# Patient Record
Sex: FEMALE | Race: WHITE | NOT HISPANIC OR LATINO | Employment: FULL TIME | URBAN - METROPOLITAN AREA
[De-identification: names, ages, dates, MRNs, and addresses within clinical notes are randomized per-mention and may not be internally consistent; named-entity substitution may affect disease eponyms.]

---

## 2023-10-25 ENCOUNTER — OFFICE VISIT (OUTPATIENT)
Dept: PRIMARY CARE | Facility: CLINIC | Age: 63
End: 2023-10-25
Payer: COMMERCIAL

## 2023-10-25 VITALS
WEIGHT: 194.6 LBS | TEMPERATURE: 97.4 F | BODY MASS INDEX: 27.86 KG/M2 | SYSTOLIC BLOOD PRESSURE: 137 MMHG | HEIGHT: 70 IN | DIASTOLIC BLOOD PRESSURE: 86 MMHG | HEART RATE: 83 BPM | OXYGEN SATURATION: 98 %

## 2023-10-25 DIAGNOSIS — Z78.0 POSTMENOPAUSAL: ICD-10-CM

## 2023-10-25 DIAGNOSIS — Z13.820 SCREENING FOR OSTEOPOROSIS: ICD-10-CM

## 2023-10-25 DIAGNOSIS — I10 BENIGN ESSENTIAL HYPERTENSION: Primary | ICD-10-CM

## 2023-10-25 DIAGNOSIS — E28.39 OVARIAN FAILURE: ICD-10-CM

## 2023-10-25 DIAGNOSIS — E78.2 MIXED HYPERLIPIDEMIA: ICD-10-CM

## 2023-10-25 DIAGNOSIS — F41.9 ANXIETY: ICD-10-CM

## 2023-10-25 PROBLEM — E78.5 HYPERLIPEMIA: Status: ACTIVE | Noted: 2023-10-25

## 2023-10-25 PROBLEM — H90.0 CONDUCTIVE HEARING LOSS, BILATERAL: Status: ACTIVE | Noted: 2023-10-25

## 2023-10-25 PROCEDURE — 1036F TOBACCO NON-USER: CPT | Performed by: FAMILY MEDICINE

## 2023-10-25 PROCEDURE — 3079F DIAST BP 80-89 MM HG: CPT | Performed by: FAMILY MEDICINE

## 2023-10-25 PROCEDURE — 99214 OFFICE O/P EST MOD 30 MIN: CPT | Performed by: FAMILY MEDICINE

## 2023-10-25 PROCEDURE — 3075F SYST BP GE 130 - 139MM HG: CPT | Performed by: FAMILY MEDICINE

## 2023-10-25 RX ORDER — METOPROLOL SUCCINATE 25 MG/1
1 TABLET, EXTENDED RELEASE ORAL DAILY
COMMUNITY
Start: 2014-04-08 | End: 2024-03-11

## 2023-10-25 RX ORDER — ESCITALOPRAM OXALATE 10 MG/1
10 TABLET ORAL DAILY
COMMUNITY
Start: 2018-07-30 | End: 2023-12-16 | Stop reason: SDUPTHER

## 2023-10-25 RX ORDER — FLUTICASONE PROPIONATE 50 MCG
2 SPRAY, SUSPENSION (ML) NASAL
COMMUNITY
Start: 2019-11-20

## 2023-10-25 RX ORDER — LOSARTAN POTASSIUM AND HYDROCHLOROTHIAZIDE 25; 100 MG/1; MG/1
1 TABLET ORAL
COMMUNITY
Start: 2015-04-21 | End: 2024-03-11

## 2023-10-25 ASSESSMENT — PATIENT HEALTH QUESTIONNAIRE - PHQ9
1. LITTLE INTEREST OR PLEASURE IN DOING THINGS: NOT AT ALL
SUM OF ALL RESPONSES TO PHQ9 QUESTIONS 1 AND 2: 0
2. FEELING DOWN, DEPRESSED OR HOPELESS: NOT AT ALL

## 2023-10-25 NOTE — PROGRESS NOTES
Subjective   Patient ID: Daily Rojo is a 63 y.o. female who presents for New Patient Visit.    HPI     Patient states she is here to establish care.     No concerns at this time.    Pt declines flu shot     Patient has hypertension.  Patient does not monitor BP at home.    Denies CP, SOB, dizziness, and LE edema.    Patient is compliant with antihypertensive therapy and denies any noted side effects.    Patient has hyperlipidemia.  Patient tries to limit the amount of fatty foods and high cholesterol foods that are consumed.  Patient is compliant with current medication and denies any noted side effects.    She has known anxiety.  Symptoms have been stable with daily Lexapro.    Pt is postmenopausal.  Unsure of screening for osteoporosis.      Review of Systems  Constitutional: Patient denies any fever, chills, loss of appetite, or unexplained weight loss.  HEENT: Denies any headache, sore throat, eye pain, ear pain, decreased vision, or decreased hearing.  Patient also denies any rhinorrhea.  Cardiovascular: Patient denies any chest pain, shortness of breath with exertion, tachycardia, palpitations, orthopnea, or paroxysmal nocturnal dyspnea.  Respiratory: Patient denies any cough, shortness breath, or wheezing.  Gastrointestinal: Patient denies any nausea, vomiting, diarrhea, constipation, melena, hematochezia, or reflux symptoms.  Musculoskeletal: Patient denies any myalgia, arthralgia, joint swelling, or joint deformity   Skin: Denies any rashes or skin lesions.   Neurology: Patient denies any new motor or sensory losses.  Denies any numbness, tingling, weakness, and incoordination of the extremities.  Patient also denies any tremor, seizures, or gait instability.  Endocrinology: Denies any polyuria, polydipsia, polyphagia, or heat/cold intolerance.  Psychiatric: Denies any anxiety, depression, or suicidal/homicidal ideation.  Hematology: Patient denies any abnormal bruising or bleeding.      Objective   BP  "137/86   Pulse 83   Temp 36.3 °C (97.4 °F)   Ht 1.778 m (5' 10\")   Wt 88.3 kg (194 lb 9.6 oz)   SpO2 98%   BMI 27.92 kg/m²     Physical Exam  Gen. appearance: Alert and cooperative, no acute distress, well-developed/well-nourished.  Head: Normocephalic and atraumatic.  EENT: Pupils are equal round reactive to light, extraocular muscles are intact, mucous membranes are moist, external auditory canals and tympanic membranes are within normal limits bilaterally, pharynx is without erythema or exudate, there is no noted rhinorrhea.  Neck: Supple and without adenopathy, no JVD at 90° and no carotid bruits are noted.  There is no thyromegaly, thyroid tenderness, or palpable thyroid nodules.  Cardiovascular: Regular rate and rhythm without murmur or ectopy.  Respiratory: Lungs are clear to auscultation bilaterally with good air exchange.  Good respiratory effort and no accessory muscle use.  Abdomen: Soft, nontender/nondistended.  No masses, guarding, rebound, or rigidity.  No hepatosplenomegaly, abdominal bruits, or CVA tenderness.  Bowel sounds are normal.  There is no widening of the aortic pulsation.  Musculoskeletal: Patient has good range of motion of the shoulders, elbows, wrists, hips, knees.  There are no noted joint effusions or deformities.  Skin: Good turgor, moist, warm and without rashes or lesions.  Lymph nodes: No cervical, clavicular, or inguinal adenopathy.  Neurological exam: Alert and oriented ×3, no tremor, normal gait.  Psychiatric: Appropriate mood and affect, good insight and judgment, no delusions or thought disorders, no suicidal or homicidal ideation.  Extremities: No clubbing, cyanosis, or edema      Assessment/Plan     1. Benign essential hypertension  Blood pressure appears adequately controlled and we will continue with the current antihypertensive therapy.  - Lipid Panel; Future  - Basic Metabolic Panel; Future    2. Mixed hyperlipidemia  Patient will continue with the current " conservative treatment.  Dietary changes, exercise, and maintenance of healthy weight were discussed at length.  - Comprehensive Metabolic Panel; Future  - Lipid Panel; Future    3. Anxiety  Stable at this time.  Continue current medication without change.    4. Postmenopausal  We will order a screening for osteoporosis  - XR DEXA bone density; Future    5. Ovarian failure  We will order a screening for osteoporosis  - XR DEXA bone density; Future        MAMM DUE 4/8/2024  COLON DUE 3/2025    Orders Placed This Encounter   Procedures    XR DEXA bone density    Comprehensive Metabolic Panel    Lipid Panel    Basic Metabolic Panel     Requested Prescriptions      No prescriptions requested or ordered in this encounter

## 2023-10-25 NOTE — PATIENT INSTRUCTIONS
SCHEDULE YOUR BONE DENSITY.  HAVE LABS SOON AND AGAIN IN 6 MONTHS    IT WAS GREAT TO MEET YOU TODAY.  Follow up in 6 months with labs to be done PRIOR.    It was a pleasure to see you today. Thank you for choosing us for your health care needs.    If you have lab or other testing completed and have not been informed of results within one week, please call the office for your results.    If you haven't done so, consider signing up for [facility] [employer], the [facility] personal health record. Ask the staff how you can get started.

## 2023-11-06 ENCOUNTER — TELEPHONE (OUTPATIENT)
Dept: PRIMARY CARE | Facility: CLINIC | Age: 63
End: 2023-11-06
Payer: COMMERCIAL

## 2023-11-06 NOTE — TELEPHONE ENCOUNTER
Patient Stated she called for insurance for the dexa scan and they has told her that they'll approve preventive care not diagnosis. If is for diagnosis she would have to pay 300 out of pocket.

## 2023-12-16 DIAGNOSIS — F41.9 ANXIETY: Primary | ICD-10-CM

## 2023-12-17 RX ORDER — ESCITALOPRAM OXALATE 10 MG/1
10 TABLET ORAL DAILY
Qty: 90 TABLET | Refills: 0 | Status: SHIPPED | OUTPATIENT
Start: 2023-12-17 | End: 2024-03-18

## 2024-03-11 DIAGNOSIS — I10 BENIGN ESSENTIAL HYPERTENSION: Primary | ICD-10-CM

## 2024-03-11 RX ORDER — METOPROLOL SUCCINATE 25 MG/1
25 TABLET, EXTENDED RELEASE ORAL DAILY
Qty: 30 TABLET | Refills: 0 | Status: SHIPPED | OUTPATIENT
Start: 2024-03-11 | End: 2024-04-10 | Stop reason: SDUPTHER

## 2024-03-11 RX ORDER — LOSARTAN POTASSIUM AND HYDROCHLOROTHIAZIDE 25; 100 MG/1; MG/1
1 TABLET ORAL DAILY
Qty: 30 TABLET | Refills: 0 | Status: SHIPPED | OUTPATIENT
Start: 2024-03-11 | End: 2024-05-06 | Stop reason: SDUPTHER

## 2024-03-11 NOTE — TELEPHONE ENCOUNTER
Patient is requesting a short script of her blood pressure medications as she says she has run out at this time and she no longer has none left, please advise

## 2024-03-15 DIAGNOSIS — F41.9 ANXIETY: ICD-10-CM

## 2024-03-18 RX ORDER — ESCITALOPRAM OXALATE 10 MG/1
10 TABLET ORAL DAILY
Qty: 90 TABLET | Refills: 0 | Status: SHIPPED | OUTPATIENT
Start: 2024-03-18 | End: 2024-05-06 | Stop reason: SDUPTHER

## 2024-04-10 DIAGNOSIS — I10 BENIGN ESSENTIAL HYPERTENSION: ICD-10-CM

## 2024-04-10 RX ORDER — METOPROLOL SUCCINATE 25 MG/1
25 TABLET, EXTENDED RELEASE ORAL DAILY
Qty: 30 TABLET | Refills: 0 | Status: SHIPPED | OUTPATIENT
Start: 2024-04-10 | End: 2024-05-06 | Stop reason: SDUPTHER

## 2024-04-10 NOTE — TELEPHONE ENCOUNTER
Patient states she had gotten a call from [facility] and states the rx has to be resent in as there was an error within the system

## 2024-05-01 ENCOUNTER — LAB (OUTPATIENT)
Dept: LAB | Facility: LAB | Age: 64
End: 2024-05-01
Payer: COMMERCIAL

## 2024-05-01 DIAGNOSIS — E78.2 MIXED HYPERLIPIDEMIA: ICD-10-CM

## 2024-05-01 DIAGNOSIS — I10 BENIGN ESSENTIAL HYPERTENSION: ICD-10-CM

## 2024-05-01 LAB
ALBUMIN SERPL BCP-MCNC: 4.3 G/DL (ref 3.4–5)
ALP SERPL-CCNC: 77 U/L (ref 33–136)
ALT SERPL W P-5'-P-CCNC: 15 U/L (ref 7–45)
ANION GAP SERPL CALC-SCNC: 12 MMOL/L (ref 10–20)
AST SERPL W P-5'-P-CCNC: 17 U/L (ref 9–39)
BILIRUB SERPL-MCNC: 0.6 MG/DL (ref 0–1.2)
BUN SERPL-MCNC: 21 MG/DL (ref 6–23)
CALCIUM SERPL-MCNC: 9.4 MG/DL (ref 8.6–10.3)
CHLORIDE SERPL-SCNC: 100 MMOL/L (ref 98–107)
CHOLEST SERPL-MCNC: 180 MG/DL (ref 0–199)
CHOLESTEROL/HDL RATIO: 3.4
CO2 SERPL-SCNC: 30 MMOL/L (ref 21–32)
CREAT SERPL-MCNC: 0.69 MG/DL (ref 0.5–1.05)
EGFRCR SERPLBLD CKD-EPI 2021: >90 ML/MIN/1.73M*2
GLUCOSE SERPL-MCNC: 80 MG/DL (ref 74–99)
HDLC SERPL-MCNC: 53.3 MG/DL
LDLC SERPL CALC-MCNC: 108 MG/DL
NON HDL CHOLESTEROL: 127 MG/DL (ref 0–149)
POTASSIUM SERPL-SCNC: 3.6 MMOL/L (ref 3.5–5.3)
PROT SERPL-MCNC: 6.5 G/DL (ref 6.4–8.2)
SODIUM SERPL-SCNC: 138 MMOL/L (ref 136–145)
TRIGL SERPL-MCNC: 95 MG/DL (ref 0–149)
VLDL: 19 MG/DL (ref 0–40)

## 2024-05-01 PROCEDURE — 80053 COMPREHEN METABOLIC PANEL: CPT

## 2024-05-01 PROCEDURE — 80061 LIPID PANEL: CPT

## 2024-05-01 PROCEDURE — 36415 COLL VENOUS BLD VENIPUNCTURE: CPT

## 2024-05-06 ENCOUNTER — LAB (OUTPATIENT)
Dept: LAB | Facility: LAB | Age: 64
End: 2024-05-06
Payer: COMMERCIAL

## 2024-05-06 ENCOUNTER — OFFICE VISIT (OUTPATIENT)
Dept: PRIMARY CARE | Facility: CLINIC | Age: 64
End: 2024-05-06
Payer: COMMERCIAL

## 2024-05-06 VITALS
BODY MASS INDEX: 28.36 KG/M2 | TEMPERATURE: 97.2 F | OXYGEN SATURATION: 98 % | HEART RATE: 74 BPM | DIASTOLIC BLOOD PRESSURE: 82 MMHG | WEIGHT: 198.1 LBS | HEIGHT: 70 IN | SYSTOLIC BLOOD PRESSURE: 138 MMHG

## 2024-05-06 DIAGNOSIS — F41.9 ANXIETY: ICD-10-CM

## 2024-05-06 DIAGNOSIS — I10 BENIGN ESSENTIAL HYPERTENSION: Primary | ICD-10-CM

## 2024-05-06 DIAGNOSIS — R53.83 FATIGUE, UNSPECIFIED TYPE: ICD-10-CM

## 2024-05-06 DIAGNOSIS — Z12.31 ENCOUNTER FOR SCREENING MAMMOGRAM FOR MALIGNANT NEOPLASM OF BREAST: ICD-10-CM

## 2024-05-06 DIAGNOSIS — E78.2 MIXED HYPERLIPIDEMIA: ICD-10-CM

## 2024-05-06 LAB
BASOPHILS # BLD AUTO: 0.03 X10*3/UL (ref 0–0.1)
BASOPHILS NFR BLD AUTO: 0.6 %
EOSINOPHIL # BLD AUTO: 0.06 X10*3/UL (ref 0–0.7)
EOSINOPHIL NFR BLD AUTO: 1.3 %
ERYTHROCYTE [DISTWIDTH] IN BLOOD BY AUTOMATED COUNT: 12.6 % (ref 11.5–14.5)
HCT VFR BLD AUTO: 39 % (ref 36–46)
HGB BLD-MCNC: 13.1 G/DL (ref 12–16)
IMM GRANULOCYTES # BLD AUTO: 0.01 X10*3/UL (ref 0–0.7)
IMM GRANULOCYTES NFR BLD AUTO: 0.2 % (ref 0–0.9)
LYMPHOCYTES # BLD AUTO: 1.7 X10*3/UL (ref 1.2–4.8)
LYMPHOCYTES NFR BLD AUTO: 35.9 %
MCH RBC QN AUTO: 30.8 PG (ref 26–34)
MCHC RBC AUTO-ENTMCNC: 33.6 G/DL (ref 32–36)
MCV RBC AUTO: 92 FL (ref 80–100)
MONOCYTES # BLD AUTO: 0.3 X10*3/UL (ref 0.1–1)
MONOCYTES NFR BLD AUTO: 6.3 %
NEUTROPHILS # BLD AUTO: 2.63 X10*3/UL (ref 1.2–7.7)
NEUTROPHILS NFR BLD AUTO: 55.7 %
NRBC BLD-RTO: 0 /100 WBCS (ref 0–0)
PLATELET # BLD AUTO: 205 X10*3/UL (ref 150–450)
RBC # BLD AUTO: 4.26 X10*6/UL (ref 4–5.2)
TSH SERPL-ACNC: 2.27 MIU/L (ref 0.44–3.98)
WBC # BLD AUTO: 4.7 X10*3/UL (ref 4.4–11.3)

## 2024-05-06 PROCEDURE — 99214 OFFICE O/P EST MOD 30 MIN: CPT | Performed by: FAMILY MEDICINE

## 2024-05-06 PROCEDURE — 3075F SYST BP GE 130 - 139MM HG: CPT | Performed by: FAMILY MEDICINE

## 2024-05-06 PROCEDURE — 36415 COLL VENOUS BLD VENIPUNCTURE: CPT

## 2024-05-06 PROCEDURE — 84443 ASSAY THYROID STIM HORMONE: CPT

## 2024-05-06 PROCEDURE — 3079F DIAST BP 80-89 MM HG: CPT | Performed by: FAMILY MEDICINE

## 2024-05-06 PROCEDURE — 85025 COMPLETE CBC W/AUTO DIFF WBC: CPT

## 2024-05-06 RX ORDER — METOPROLOL SUCCINATE 25 MG/1
25 TABLET, EXTENDED RELEASE ORAL DAILY
Qty: 90 TABLET | Refills: 1 | Status: SHIPPED | OUTPATIENT
Start: 2024-05-06

## 2024-05-06 RX ORDER — ESCITALOPRAM OXALATE 10 MG/1
10 TABLET ORAL DAILY
Qty: 90 TABLET | Refills: 1 | Status: SHIPPED | OUTPATIENT
Start: 2024-05-06

## 2024-05-06 RX ORDER — LOSARTAN POTASSIUM AND HYDROCHLOROTHIAZIDE 25; 100 MG/1; MG/1
1 TABLET ORAL DAILY
Qty: 90 TABLET | Refills: 1 | Status: SHIPPED | OUTPATIENT
Start: 2024-05-06

## 2024-05-06 ASSESSMENT — PATIENT HEALTH QUESTIONNAIRE - PHQ9
1. LITTLE INTEREST OR PLEASURE IN DOING THINGS: NOT AT ALL
SUM OF ALL RESPONSES TO PHQ9 QUESTIONS 1 AND 2: 0
SUM OF ALL RESPONSES TO PHQ9 QUESTIONS 1 AND 2: 0
2. FEELING DOWN, DEPRESSED OR HOPELESS: NOT AT ALL
1. LITTLE INTEREST OR PLEASURE IN DOING THINGS: NOT AT ALL
2. FEELING DOWN, DEPRESSED OR HOPELESS: NOT AT ALL

## 2024-05-06 NOTE — PROGRESS NOTES
"Subjective   Patient ID: Daily Rojo is a 63 y.o. female who presents for Follow-up.    HPI     Patient states she has been fatigued lately. She says the fatigue has been going on for a few months now. She says this occurs mostly in the evening.     Patient is also concerned about her weight as she states she has been struggling to lose weight.      No other concerns at this time.     Patient has hypertension.  Patient does not monitor BP at home.    Denies CP, SOB, dizziness, and LE edema.    Patient is compliant with antihypertensive therapy and denies any noted side effects.    Patient has hyperlipidemia.  Patient tries to limit the amount of fatty foods and high cholesterol foods that are consumed.  Patient is currently treated conservatively with dietary changes and lifestyle modification.    She has anxiety.  Symptoms remain stable with the current dose of escitalopram.        Review of Systems  Constitutional: Patient denies any fever, chills, loss of appetite, or unexplained weight loss.  Cardiovascular: Patient denies any chest pain, shortness of breath with exertion, tachycardia, palpitations, orthopnea, or paroxysmal nocturnal dyspnea.  Respiratory: Patient denies any cough, shortness breath, or wheezing.  Skin: Denies any rashes or skin lesions.   Neurology: Patient denies any new motor or sensory losses.  Denies any numbness, tingling, weakness, and incoordination of the extremities.  Patient also denies any tremor, seizures, or gait instability.  Endocrinology: Denies any polyuria, polydipsia, polyphagia, or heat/cold intolerance.      Objective   /82   Pulse 74   Temp 36.2 °C (97.2 °F)   Ht 1.778 m (5' 10\")   Wt 89.9 kg (198 lb 1.6 oz)   SpO2 98%   BMI 28.42 kg/m²     Physical Exam  General Appearance: Alert and cooperative, in no acute distress, well-developed/well-nourished.  Neck: Supple and without adenopathy or rigidity.  There is no JVD at 90° and no carotid bruits are noted.  " There is no thyromegaly, thyroid tenderness, or palpable thyroid nodules.  Heart: Regular rate and rhythm without murmur or ectopy.  Respiratory: Lungs are clear to auscultation bilaterally with good air exchange.  Good respiratory effort and no accessory muscle use.  Skin: Good turgor, moist, warm and without rashes or lesions.  Neurological exam: Alert and oriented ×3, no tremor, normal gait.  Extremities: No clubbing, cyanosis, or edema      Assessment/Plan     Benign essential hypertension  Blood pressure appears adequately controlled and we will continue with the current antihypertensive therapy.     Mixed hyperlipidemia  Patient will continue with the current conservative treatment.  Dietary changes, exercise, and maintenance of healthy weight were discussed at length.    Anxiety  Stable at this time.  Continue current medication without change.     Screening for breast cancer:  Screening mammogram was ordered.    Fatigue: Labs were ordered for further evaluation.        MAMM DUE 4/8/2024  COLON DUE 3/2025        Orders Placed This Encounter   Procedures    BI mammo bilateral screening tomosynthesis    CBC and Auto Differential    TSH with reflex to Free T4 if abnormal     Requested Prescriptions     Signed Prescriptions Disp Refills    escitalopram (Lexapro) 10 mg tablet 90 tablet 1     Sig: Take 1 tablet (10 mg) by mouth once daily.    losartan-hydrochlorothiazide (Hyzaar) 100-25 mg tablet 90 tablet 1     Sig: Take 1 tablet by mouth once daily.    metoprolol succinate XL (Toprol-XL) 25 mg 24 hr tablet 90 tablet 1     Sig: Take 1 tablet (25 mg) by mouth once daily.

## 2024-05-06 NOTE — PATIENT INSTRUCTIONS
Labs today to evaluate the fatigue.    Follow up in 6 months.    It was a pleasure to see you today. Thank you for choosing us for your health care needs.    If you have lab or other testing completed and have not been informed of results within one week, please call the office for your results.    If you haven't done so, consider signing up for [facility], the [facility] personal health record. Ask the staff how you can get started.

## 2024-06-29 ENCOUNTER — APPOINTMENT (OUTPATIENT)
Dept: PRIMARY CARE | Facility: CLINIC | Age: 64
End: 2024-06-29
Payer: COMMERCIAL

## 2024-07-15 ENCOUNTER — APPOINTMENT (OUTPATIENT)
Dept: PRIMARY CARE | Facility: CLINIC | Age: 64
End: 2024-07-15
Payer: COMMERCIAL

## 2024-07-15 VITALS
TEMPERATURE: 97.2 F | HEART RATE: 90 BPM | DIASTOLIC BLOOD PRESSURE: 89 MMHG | BODY MASS INDEX: 28.42 KG/M2 | SYSTOLIC BLOOD PRESSURE: 137 MMHG | HEIGHT: 70 IN | OXYGEN SATURATION: 94 %

## 2024-07-15 DIAGNOSIS — T78.40XA ALLERGIC REACTION, INITIAL ENCOUNTER: Primary | ICD-10-CM

## 2024-07-15 PROCEDURE — 3079F DIAST BP 80-89 MM HG: CPT | Performed by: FAMILY MEDICINE

## 2024-07-15 PROCEDURE — 3075F SYST BP GE 130 - 139MM HG: CPT | Performed by: FAMILY MEDICINE

## 2024-07-15 PROCEDURE — 99212 OFFICE O/P EST SF 10 MIN: CPT | Performed by: FAMILY MEDICINE

## 2024-07-15 ASSESSMENT — PATIENT HEALTH QUESTIONNAIRE - PHQ9
2. FEELING DOWN, DEPRESSED OR HOPELESS: NOT AT ALL
SUM OF ALL RESPONSES TO PHQ9 QUESTIONS 1 AND 2: 0
1. LITTLE INTEREST OR PLEASURE IN DOING THINGS: NOT AT ALL

## 2024-07-15 NOTE — PROGRESS NOTES
"Subjective   Patient ID: Daily Rojo is a 64 y.o. female who presents for Eye Problem (Swollen Eyes).    HPI     Patient states she went to  a [facility] urgent care on 7/13/24 due to swollen bilateral eyes.   Pt was diagnosed with periorbital cellulitis.  She was given Augmentin.  She has also been taking benadryl twice a day. She says that her eyes have improved a lot since then, but says that she is still experiencing bilateral irritation, itching, redness, and a bit of swelling.     She states she tried some anti-aging lotion and thinks she had a  reaction that caused the swelling of her eyelids.        Review of Systems  Constitutional: Patient denies any fever, chills, loss of appetite, or unexplained weight loss.  Cardiovascular: Denies any chest pain, shortness of breath with exertion, tachycardia, palpitations.  Respiratory: Patient denies any cough, shortness of breath, or wheezing.    Eyes: Irritation, itching, redness, swelling bilaterally.     Objective   /89   Pulse 90   Temp 36.2 °C (97.2 °F)   Ht 1.778 m (5' 10\")   SpO2 94%   BMI 28.42 kg/m²     Physical Exam  General Appearance: Alert and cooperative, in no acute distress, well-developed/well-nourished.  Neck: Supple and without adenopathy or rigidity. There is no JVD at 90° and no carotid bruits are noted. There is no thyromegaly, thyroid tenderness, or palpable thyroid nodules.  Heart: Regular rate and rhythm without murmur or ectopy.  Lungs: Clear to auscultation bilaterally with good air exchange.  Skin: Good turgor, moist, warm and without rashes or lesions.  Neurological exam: Alert and oriented ×3, no tremor, normal gait.  Extremities: No clubbing, cyanosis, or edema    Eyes: Notable dry skin in the infraorbital region bilaterally. Conjunctiva and sclera appear normal.    Assessment/Plan        1. Allergic reaction, initial encounter  Recommended she  Cortisone-10 or Coratid to apply sparingly to the affected " infraorbital area.  She will finish the antibiotic prescribed by the Urgent Care.   Pt to follow up if not resolved over the next 5-7 days or ASAP if the symptoms worsen.      Scribe Attestation  By signing my name below, INinfa Scribe   attest that this documentation has been prepared under the direction and in the presence of Usman Hastings DO.

## 2024-07-15 NOTE — PATIENT INSTRUCTIONS
You can use the Cortaid as we discussed.    Finish the antibiotic given to you by the Urgent Care.    Follow up should symptoms worsen.    It was a pleasure to see you today. Thank you for choosing us for your health care needs.    If you have lab or other testing completed and have not been informed of results within one week, please call the office for your results.    If you haven't done so, consider signing up for [facility] [employer], the [facility] personal health record. Ask the staff how you can get started.

## 2024-08-22 ENCOUNTER — HOSPITAL ENCOUNTER (OUTPATIENT)
Dept: RADIOLOGY | Facility: EXTERNAL LOCATION | Age: 64
Discharge: HOME | End: 2024-08-22

## 2024-08-22 DIAGNOSIS — R52 PAIN: ICD-10-CM

## 2024-08-23 ENCOUNTER — OFFICE VISIT (OUTPATIENT)
Dept: ORTHOPEDIC SURGERY | Facility: CLINIC | Age: 64
End: 2024-08-23
Payer: COMMERCIAL

## 2024-08-23 DIAGNOSIS — S63.659A SPRAIN OF MCP JOINT OF HAND, INITIAL ENCOUNTER: ICD-10-CM

## 2024-08-23 DIAGNOSIS — S62.646A NONDISPLACED FRACTURE OF PROXIMAL PHALANX OF RIGHT LITTLE FINGER, INITIAL ENCOUNTER FOR CLOSED FRACTURE: Primary | ICD-10-CM

## 2024-08-23 PROCEDURE — 26720 TREAT FINGER FRACTURE EACH: CPT | Performed by: STUDENT IN AN ORGANIZED HEALTH CARE EDUCATION/TRAINING PROGRAM

## 2024-08-23 PROCEDURE — 99204 OFFICE O/P NEW MOD 45 MIN: CPT | Performed by: STUDENT IN AN ORGANIZED HEALTH CARE EDUCATION/TRAINING PROGRAM

## 2024-08-23 PROCEDURE — 1036F TOBACCO NON-USER: CPT | Performed by: STUDENT IN AN ORGANIZED HEALTH CARE EDUCATION/TRAINING PROGRAM

## 2024-08-23 NOTE — PROGRESS NOTES
History of Present Illness:  Presents for evaluation of right ring and small finger pain.  The patient sustained an acute injury and notes pain and swelling.    The pain is sharp in nature, severe, worse with movement and better with rest.    Sustained a fall on Wednesday.  Seen at [facility].    Review of Systems   GENERAL: Negative for malaise, significant weight loss, fever  MUSCULOSKELETAL: see HPI  NEURO:  Negative    The patient's past medical history, family history, social history, and review of systems were reviewed. History is otherwise negative except as stated in the HPI.    Physical Examination:  General: Alert and oriented to person, place, and time.  No acute distress and breathing comfortably: Pleasant and cooperative with examination.  HEENT: Head is normocephalic and atraumatic.  Neck: Supple, no visible swelling.  Cardiovascular: No palpable tachycardia  Lungs: No audible wheezing or labored breathing  Abdomen: Nondistended.  On musculoskeletal examination, the patient has full elbow range of motion. In regards to the hand, there is swelling with some deformity. There is ecchymosis of the hand, but the skin is intact. Range of motion and strength are limited secondary to pain. There is tenderness to palpation of the proximal phalanx of the ring and small finger along with pain over the ulnar MCPs of both. There is no obvious tenderness to palpation about the scaphoid or the SL interval, or distal radius. Sensation and motor function are intact in the radial, ulnar, and median nerve distribution. The patient has intact flexor and extensor function, but has difficulty making a full fist secondary to pain. The hand itself is warm and well perfused. The contralateral hand and wrist are normal to inspection, range of motion, stability, and strength.      Imaging:  AP, lateral, and oblique radiographs of the right hand demonstrate nondisplaced proximal phalanx base fracture of the ring  finger    Assessment:  Patient with a nondisplaced proximal phalanx base fracture of the ring finger.  Additionally presumed MCP sprain.    Plan:  I had long discussion with the patient regarding the fracture. I discussed the risks/benefits/expected outcomes of both non-operative and operative management. Based on the current alignment of the fracture, the patients medical history, and the patients preference, we have elected to proceed with a well-molded splint.  Okay to come out of 3 times a day and work on range of motion at home.  Needs to be worn full-time when outside the home.  The patient understands the risks associated with fracture care, which include but are not limited to, malunion, nonunion, loss of reduction or alignment, post traumatic arthrosis, avascular necrosis, chronic pain or stiffness, tendon rupture, compression neuropathy, and potential need for future surgery. In order to minimize the risk of fracture displacement, I will follow the fracture closely with repeat radiographs. I answered all of their questions.    Follow up: 4 weeks with x-ray right hand    Blanche Rodney MD

## 2024-09-17 NOTE — PROGRESS NOTES
History of Present Illness:  Patient returns today endorsing mild pain.  Denies any numbness or tingling. DOI 8/22/24    Review of Systems   GENERAL: Negative for malaise, significant weight loss, fever  MUSCULOSKELETAL: see HPI  NEURO:  Negative    Physical Examination:  Mild swelling, skin healthy and intact  Mild tenderness to palpation right ring finger proximal phalanx  + EPL, FPL, TOMAS  + SILT median, radial, ulnar nerve distribution   Good cap refill    Imaging:  Appropriate position and alignment    Assessment:   Patient with a healing fourth and fifth proximal phalanx base fracture    Plan:  Immobilization: cast/splint removed   Encouraged ROM of digits.  Discussed rehab goals and return to activity.  Follow-up: 1 month for motion check      Blanche Rod MD

## 2024-09-19 ENCOUNTER — APPOINTMENT (OUTPATIENT)
Dept: ORTHOPEDIC SURGERY | Facility: CLINIC | Age: 64
End: 2024-09-19
Payer: COMMERCIAL

## 2024-09-19 ENCOUNTER — HOSPITAL ENCOUNTER (OUTPATIENT)
Dept: RADIOLOGY | Facility: HOSPITAL | Age: 64
Discharge: HOME | End: 2024-09-19
Payer: COMMERCIAL

## 2024-09-19 DIAGNOSIS — M79.644 PAIN OF FINGER OF RIGHT HAND: ICD-10-CM

## 2024-09-19 DIAGNOSIS — S62.646A NONDISPLACED FRACTURE OF PROXIMAL PHALANX OF RIGHT LITTLE FINGER, INITIAL ENCOUNTER FOR CLOSED FRACTURE: ICD-10-CM

## 2024-09-19 DIAGNOSIS — S62.646A NONDISPLACED FRACTURE OF PROXIMAL PHALANX OF RIGHT LITTLE FINGER, INITIAL ENCOUNTER FOR CLOSED FRACTURE: Primary | ICD-10-CM

## 2024-09-19 PROCEDURE — 1036F TOBACCO NON-USER: CPT | Performed by: STUDENT IN AN ORGANIZED HEALTH CARE EDUCATION/TRAINING PROGRAM

## 2024-09-19 PROCEDURE — 73140 X-RAY EXAM OF FINGER(S): CPT | Mod: RT

## 2024-09-19 PROCEDURE — 99024 POSTOP FOLLOW-UP VISIT: CPT | Performed by: STUDENT IN AN ORGANIZED HEALTH CARE EDUCATION/TRAINING PROGRAM

## 2024-10-24 ENCOUNTER — APPOINTMENT (OUTPATIENT)
Dept: ORTHOPEDIC SURGERY | Facility: CLINIC | Age: 64
End: 2024-10-24
Payer: COMMERCIAL

## 2024-10-24 DIAGNOSIS — S62.646A NONDISPLACED FRACTURE OF PROXIMAL PHALANX OF RIGHT LITTLE FINGER, INITIAL ENCOUNTER FOR CLOSED FRACTURE: Primary | ICD-10-CM

## 2024-10-24 PROCEDURE — 1036F TOBACCO NON-USER: CPT | Performed by: STUDENT IN AN ORGANIZED HEALTH CARE EDUCATION/TRAINING PROGRAM

## 2024-10-24 PROCEDURE — 99024 POSTOP FOLLOW-UP VISIT: CPT | Performed by: STUDENT IN AN ORGANIZED HEALTH CARE EDUCATION/TRAINING PROGRAM

## 2024-10-24 NOTE — PROGRESS NOTES
History of Present Illness:  Patient returns today endorsing no pain.  Denies any numbness or tingling. DOI 8/22/24    Review of Systems   GENERAL: Negative for malaise, significant weight loss, fever  MUSCULOSKELETAL: see HPI  NEURO:  Negative    Physical Examination:  no swelling, skin healthy and intact  no tenderness to palpation right ring finger proximal phalanx  No malrotation on exam.  0.5 cm tip to palm distance of the small finger  + EPL, FPL, TOMAS  + SILT median, radial, ulnar nerve distribution   Good cap refill      Assessment:   Patient with a healing fourth and fifth proximal phalanx base fracture.  Doing very well.  No pain.  Close to full range of motion.  No malrotation on exam    Plan:  Encouraged ROM of digits.  Discussed rehab goals and return to activity.  Follow-up as needed    Blanche Rod MD

## 2024-11-04 ENCOUNTER — APPOINTMENT (OUTPATIENT)
Dept: PRIMARY CARE | Facility: CLINIC | Age: 64
End: 2024-11-04
Payer: COMMERCIAL

## 2024-11-04 VITALS
TEMPERATURE: 97.2 F | BODY MASS INDEX: 28.42 KG/M2 | HEART RATE: 74 BPM | SYSTOLIC BLOOD PRESSURE: 132 MMHG | OXYGEN SATURATION: 96 % | DIASTOLIC BLOOD PRESSURE: 88 MMHG | HEIGHT: 70 IN

## 2024-11-04 DIAGNOSIS — F41.9 ANXIETY: ICD-10-CM

## 2024-11-04 DIAGNOSIS — I10 BENIGN ESSENTIAL HYPERTENSION: Primary | ICD-10-CM

## 2024-11-04 DIAGNOSIS — E78.2 MIXED HYPERLIPIDEMIA: ICD-10-CM

## 2024-11-04 PROCEDURE — 3075F SYST BP GE 130 - 139MM HG: CPT | Performed by: FAMILY MEDICINE

## 2024-11-04 PROCEDURE — 99214 OFFICE O/P EST MOD 30 MIN: CPT | Performed by: FAMILY MEDICINE

## 2024-11-04 PROCEDURE — 3079F DIAST BP 80-89 MM HG: CPT | Performed by: FAMILY MEDICINE

## 2024-11-04 RX ORDER — METOPROLOL SUCCINATE 25 MG/1
25 TABLET, EXTENDED RELEASE ORAL DAILY
Qty: 90 TABLET | Refills: 1 | Status: SHIPPED | OUTPATIENT
Start: 2024-11-04

## 2024-11-04 RX ORDER — LOSARTAN POTASSIUM AND HYDROCHLOROTHIAZIDE 25; 100 MG/1; MG/1
1 TABLET ORAL DAILY
Qty: 90 TABLET | Refills: 1 | Status: SHIPPED | OUTPATIENT
Start: 2024-11-04

## 2024-11-04 RX ORDER — ESCITALOPRAM OXALATE 10 MG/1
10 TABLET ORAL DAILY
Qty: 90 TABLET | Refills: 1 | Status: SHIPPED | OUTPATIENT
Start: 2024-11-04

## 2024-11-04 NOTE — PATIENT INSTRUCTIONS
PLEASE SCHEDULE YOUR MAMMOGRAM SOON.    Follow up in 6 months with labs to be done PRIOR.    It was a pleasure to see you today. Thank you for choosing us for your health care needs.    If you have lab or other testing completed and have not been informed of results within one week, please call the office for your results.    If you haven't done so, consider signing up for [facility], the [facility] personal health record. Ask the staff how you can get started.          Ways to Help Prevent Falls at Home    Quick Tips   ? Ask for help if you need it. Most people want to help!   ? Get up slowly after sitting or laying down   ? Wear a medical alert device or keep cell phone in your pocket   ? Use night lights, especially areas near a bathroom   ? Keep the items you use often within reach on a small stool or end table   ? Use an assistive device such as walker or cane, as directed by provider/physical therapy   ? Use a non-slip mat and grab bars in your bathroom. Look for home health sections for best options     Other Areas to Focus On   ? Exercise and nutrition: Regular exercise or taking a falls prevention class are great ways improve strength and balance. Don’t forget to stay hydrated and bring a snack!   ? Medicine side effects: Some medicines can make you sleepy or dizzy, which could cause a fall. Ask your healthcare provider about the side effects your medicines could cause. Be sure to let them know if you take any vitamins or supplements as well.   ? Tripping hazards: Remove items you could trip on, such as loose mats, rugs, cords, and clutter. Wear closed toe shoes with rubber soles.   ? Health and wellness: Get regular checkups with your healthcare provider, plus routine vision and hearing screenings. Talk with your healthcare provider about:   o Your medicines and the possible side effects - bring them in a bag if that is easier!   o Problems with balance or feeling dizzy   o Ways  to promote bone health, such as Vitamin D and calcium supplements   o Questions or concerns about falling     *Ask your healthcare team if you have questions     ©[facility], 2022

## 2024-11-04 NOTE — PROGRESS NOTES
"Subjective   Patient ID: Daily Rojo is a 64 y.o. female who presents for Follow-up.    HPI     Patient had a fall about two months ago  She broke her fingers during this incident    No recent BW  Mammogram: 4/2023 (due)  Colonoscopy: 3/2020    Patient has hypertension.  Patient does not monitor BP at home.    Denies CP, SOB, dizziness, and LE edema.    Patient is compliant with antihypertensive therapy and denies any noted side effects.     Patient has hyperlipidemia.  Patient tries to limit the amount of fatty foods and high cholesterol foods that are consumed.  Patient is currently treated conservatively with dietary changes and lifestyle modification.     She has anxiety.  Symptoms remain stable with the current dose of escitalopram.      Review of Systems    Cardiovascular: Patient denies any chest pain, shortness of breath with exertion, tachycardia, palpitations, orthopnea, or paroxysmal nocturnal dyspnea.  Respiratory: Patient denies any cough, shortness breath, or wheezing.  Gastrointestinal: Patient denies any nausea, vomiting, diarrhea, constipation, melena, hematochezia, or reflux symptoms  Skin: Denies any rashes or skin lesions  Neurology: Patient denies any new motor or sensory losses. Denies any numbness, tingling, weakness, and incoordination of the extremities. Patient also denies any tremor, seizures, or gait instability.  Endocrinology: Denies any polyuria, polydipsia, polyphagia, or heat/cold intolerance.  Psychiatric: She denies any depression, anxiety, or suicidal/homicidal ideation.    Objective   /88   Pulse 74   Temp 36.2 °C (97.2 °F)   Ht 1.778 m (5' 10\")   SpO2 96%   BMI 28.42 kg/m²     Physical Exam    General Appearance: Alert and cooperative, in no acute distress, well-developed/well-nourished female  Neck: Supple and without adenopathy or rigidity. There is no JVD at 90° and no carotid bruits are noted. There is no thyromegaly, thyroid tenderness, or palpable thyroid " Stable  No signs of bleeding   nodules.  Heart: Regular rate and rhythm without murmur or ectopy.  Lungs: Clear to auscultation bilaterally with good air exchange.  Skin: Good turgor, moist, warm and without rashes or lesions.  Neurological exam: Alert and oriented ×3, no tremor, normal gait.  Extremities: No clubbing, cyanosis, or edema  Psychiatric: Appropriate mood and affect, good insight and judgment, no delusions or thought disorders, no suicidal or homicidal ideation    Assessment/Plan     HTN:  Blood pressure appears adequately controlled and we will continue with the current antihypertensive therapy.    Hyperlipidemia:   Patient will continue with the current medication.  Dietary changes, exercise, and maintenance of healthy weight were discussed at length.    Anxiety:  Stable based on symptoms.  Will continue with current treatment plan.        COLONOSCOPY DUE 3/2025  MAMMOGRAM OVERDUE; she assures us she plans on getting this done before the end of the year        Follow up in six months or sooner if needed.    Scribe Attestation  By signing my name below, INatividad Scribe   attest that this documentation has been prepared under the direction and in the presence of Usman Hastings DO.    Requested Prescriptions     Pending Prescriptions Disp Refills    escitalopram (Lexapro) 10 mg tablet 90 tablet 1     Sig: Take 1 tablet (10 mg) by mouth once daily.    losartan-hydrochlorothiazide (Hyzaar) 100-25 mg tablet 90 tablet 1     Sig: Take 1 tablet by mouth once daily.    metoprolol succinate XL (Toprol-XL) 25 mg 24 hr tablet 90 tablet 1     Sig: Take 1 tablet (25 mg) by mouth once daily.     Orders Placed This Encounter   Procedures    Basic Metabolic Panel    Lipid Panel       Patient was identified as a fall risk. Risk prevention instructions provided.

## 2024-11-07 ENCOUNTER — APPOINTMENT (OUTPATIENT)
Dept: RADIOLOGY | Facility: HOSPITAL | Age: 64
End: 2024-11-07
Payer: COMMERCIAL

## 2024-11-08 ENCOUNTER — HOSPITAL ENCOUNTER (OUTPATIENT)
Dept: RADIOLOGY | Facility: HOSPITAL | Age: 64
Discharge: HOME | End: 2024-11-08
Payer: COMMERCIAL

## 2024-11-08 DIAGNOSIS — Z12.31 ENCOUNTER FOR SCREENING MAMMOGRAM FOR MALIGNANT NEOPLASM OF BREAST: ICD-10-CM

## 2024-11-08 PROCEDURE — 77067 SCR MAMMO BI INCL CAD: CPT

## 2025-05-05 ENCOUNTER — APPOINTMENT (OUTPATIENT)
Dept: PRIMARY CARE | Facility: CLINIC | Age: 65
End: 2025-05-05
Payer: COMMERCIAL

## 2025-05-05 VITALS
SYSTOLIC BLOOD PRESSURE: 133 MMHG | HEIGHT: 70 IN | TEMPERATURE: 93.4 F | BODY MASS INDEX: 28.92 KG/M2 | OXYGEN SATURATION: 95 % | DIASTOLIC BLOOD PRESSURE: 83 MMHG | WEIGHT: 202 LBS | HEART RATE: 83 BPM

## 2025-05-05 DIAGNOSIS — E78.2 MIXED HYPERLIPIDEMIA: ICD-10-CM

## 2025-05-05 DIAGNOSIS — I10 BENIGN ESSENTIAL HYPERTENSION: Primary | ICD-10-CM

## 2025-05-05 DIAGNOSIS — F41.9 ANXIETY: ICD-10-CM

## 2025-05-05 DIAGNOSIS — F51.01 PRIMARY INSOMNIA: ICD-10-CM

## 2025-05-05 DIAGNOSIS — E66.3 OVERWEIGHT: ICD-10-CM

## 2025-05-05 PROCEDURE — 99214 OFFICE O/P EST MOD 30 MIN: CPT | Performed by: FAMILY MEDICINE

## 2025-05-05 PROCEDURE — 3075F SYST BP GE 130 - 139MM HG: CPT | Performed by: FAMILY MEDICINE

## 2025-05-05 PROCEDURE — 3079F DIAST BP 80-89 MM HG: CPT | Performed by: FAMILY MEDICINE

## 2025-05-05 PROCEDURE — 3008F BODY MASS INDEX DOCD: CPT | Performed by: FAMILY MEDICINE

## 2025-05-05 RX ORDER — METOPROLOL SUCCINATE 25 MG/1
25 TABLET, EXTENDED RELEASE ORAL DAILY
Qty: 90 TABLET | Refills: 1 | Status: SHIPPED | OUTPATIENT
Start: 2025-05-05

## 2025-05-05 RX ORDER — ESCITALOPRAM OXALATE 10 MG/1
10 TABLET ORAL DAILY
Qty: 90 TABLET | Refills: 1 | Status: SHIPPED | OUTPATIENT
Start: 2025-05-05

## 2025-05-05 RX ORDER — LOSARTAN POTASSIUM AND HYDROCHLOROTHIAZIDE 25; 100 MG/1; MG/1
1 TABLET ORAL DAILY
Qty: 90 TABLET | Refills: 1 | Status: SHIPPED | OUTPATIENT
Start: 2025-05-05

## 2025-05-05 ASSESSMENT — PATIENT HEALTH QUESTIONNAIRE - PHQ9
SUM OF ALL RESPONSES TO PHQ9 QUESTIONS 1 AND 2: 0
1. LITTLE INTEREST OR PLEASURE IN DOING THINGS: NOT AT ALL
2. FEELING DOWN, DEPRESSED OR HOPELESS: NOT AT ALL

## 2025-05-05 NOTE — PROGRESS NOTES
"Subjective   Patient ID: Daily Rojo is a 64 y.o. female who presents for Follow-up.    HPI     PATIENT C/O INCREASED WEIGHT GAIN.     PATIENT C/O HAVING INCREASED ANXIETY AND DIFFICULTY FALLING ASLEEP.  ONGOING SINCE START OF JANUARY.    NO RECENT BW   MAMMO: 11/2024  COLON: 03/04/2020    Patient has hypertension.  Patient does not monitor BP at home.    Denies CP, SOB, dizziness, and LE edema.    Patient is compliant with antihypertensive therapy and denies any noted side effects.     Patient has hyperlipidemia.  Patient tries to limit the amount of fatty foods and high cholesterol foods that are consumed.  Patient is currently treated conservatively with dietary changes and lifestyle modification.     She has anxiety.  Symptoms remain stable with the current dose of escitalopram.          Review of Systems    Objective   /83 (BP Location: Left arm, Patient Position: Sitting, BP Cuff Size: Adult)   Pulse 83   Temp 34.1 °C (93.4 °F) (Temporal)   Ht 1.778 m (5' 10\")   Wt 91.7 kg (202 lb 3.2 oz)   SpO2 95%   BMI 29.01 kg/m²     Physical Exam    Assessment/Plan     HTN:  Blood pressure appears adequately controlled and we will continue with the current antihypertensive therapy.     Hyperlipidemia:   Patient will continue with the current medication.  Dietary changes, exercise, and maintenance of healthy weight were discussed at length.     Lab Results   Component Value Date    CHOL 180 05/01/2024    CHOL 195 10/12/2022    CHOL 192 10/15/2021     Lab Results   Component Value Date    HDL 53.3 05/01/2024    HDL 52.8 10/12/2022    HDL 58.0 10/15/2021     Lab Results   Component Value Date    LDLCALC 108 (H) 05/01/2024     Lab Results   Component Value Date    TRIG 95 05/01/2024    TRIG 153 (H) 10/12/2022    TRIG 88 10/15/2021       Anxiety:  Stable based on symptoms.  Will continue with current treatment plan.           COLONOSCOPY DUE 3/2025  MAMMOGRAM DUE: 11/9/2025       " well-developed/well-nourished.  Neck: Supple and without adenopathy or rigidity.  There is no JVD at 90° and no carotid bruits are noted.  There is no thyromegaly, thyroid tenderness, or palpable thyroid nodules.  Heart: Regular rate and rhythm without murmur or ectopy.  Respiratory: Lungs are clear to auscultation bilaterally with good air exchange.  Good respiratory effort and no accessory muscle use.  Skin: Good turgor, moist, warm and without rashes or lesions.  Neurological exam: Alert and oriented ×3, no tremor, normal gait.  Extremities: No clubbing, cyanosis, or edema      Assessment/Plan     HTN:  Blood pressure appears adequately controlled and we will continue with the current antihypertensive therapy.     Hyperlipidemia:   Patient will continue with the current medication.  Dietary changes, exercise, and maintenance of healthy weight were discussed at length.     Lab Results   Component Value Date    CHOL 180 05/01/2024    CHOL 195 10/12/2022    CHOL 192 10/15/2021     Lab Results   Component Value Date    HDL 53.3 05/01/2024    HDL 52.8 10/12/2022    HDL 58.0 10/15/2021     Lab Results   Component Value Date    LDLCALC 108 (H) 05/01/2024     Lab Results   Component Value Date    TRIG 95 05/01/2024    TRIG 153 (H) 10/12/2022    TRIG 88 10/15/2021       Anxiety:  Symptoms have been more notable recently.  We will refer her to behavioral health for counseling.     Insomnia:  Can't try OTC Benadryl 25 mg prior to bed.  Can consider additional prescription medications if the Benadryl is not effective for her.    Overweight:  Dietary changes, exercise, and maintenance of a healthy weight were discussed at length.  Goal is to achieve a BMI less than 25.  Will refer to bariatrics at her request.       COLONOSCOPY DUE 3/2025  MAMMOGRAM DUE: 11/9/2025         Orders Placed This Encounter   Procedures    TSH with reflex to Free T4 if abnormal    Comprehensive Metabolic Panel    Lipid Panel    Referral to Bariatric  Surgery     Requested Prescriptions     Signed Prescriptions Disp Refills    escitalopram (Lexapro) 10 mg tablet 90 tablet 1     Sig: Take 1 tablet (10 mg) by mouth once daily.    losartan-hydrochlorothiazide (Hyzaar) 100-25 mg tablet 90 tablet 1     Sig: Take 1 tablet by mouth once daily.    metoprolol succinate XL (Toprol-XL) 25 mg 24 hr tablet 90 tablet 1     Sig: Take 1 tablet (25 mg) by mouth once daily.

## 2025-05-05 NOTE — PATIENT INSTRUCTIONS
Trial of over the counter Benadryl 25 mg 30 min before bed for the sleep issues.    Referred to bariatrics for help with weight loss.

## 2025-05-07 ENCOUNTER — TELEPHONE (OUTPATIENT)
Dept: PRIMARY CARE | Facility: CLINIC | Age: 65
End: 2025-05-07
Payer: COMMERCIAL

## 2025-05-07 NOTE — PROGRESS NOTES
Writer outreached pt regarding their referral to Collaborative Care. Explained program and answered pt's questions. Pt requested to schedule future initial assessment. Pt is scheduled for 6/20 @ 11:30am VV.

## 2025-05-09 LAB
ALBUMIN SERPL-MCNC: 4.6 G/DL (ref 3.6–5.1)
ALP SERPL-CCNC: 82 U/L (ref 37–153)
ALT SERPL-CCNC: 13 U/L (ref 6–29)
ANION GAP SERPL CALCULATED.4IONS-SCNC: 7 MMOL/L (CALC) (ref 7–17)
AST SERPL-CCNC: 14 U/L (ref 10–35)
BILIRUB SERPL-MCNC: 0.5 MG/DL (ref 0.2–1.2)
BUN SERPL-MCNC: 14 MG/DL (ref 7–25)
CALCIUM SERPL-MCNC: 9.4 MG/DL (ref 8.6–10.4)
CHLORIDE SERPL-SCNC: 100 MMOL/L (ref 98–110)
CHOLEST SERPL-MCNC: 192 MG/DL
CHOLEST/HDLC SERPL: 3.9 (CALC)
CO2 SERPL-SCNC: 30 MMOL/L (ref 20–32)
CREAT SERPL-MCNC: 0.65 MG/DL (ref 0.5–1.05)
EGFRCR SERPLBLD CKD-EPI 2021: 99 ML/MIN/1.73M2
GLUCOSE SERPL-MCNC: 89 MG/DL (ref 65–99)
HDLC SERPL-MCNC: 49 MG/DL
LDLC SERPL CALC-MCNC: 119 MG/DL (CALC)
NONHDLC SERPL-MCNC: 143 MG/DL (CALC)
POTASSIUM SERPL-SCNC: 4.2 MMOL/L (ref 3.5–5.3)
PROT SERPL-MCNC: 6.8 G/DL (ref 6.1–8.1)
SODIUM SERPL-SCNC: 137 MMOL/L (ref 135–146)
TRIGL SERPL-MCNC: 129 MG/DL
TSH SERPL-ACNC: 1.91 MIU/L (ref 0.4–4.5)

## 2025-06-20 ENCOUNTER — APPOINTMENT (OUTPATIENT)
Dept: PRIMARY CARE | Facility: CLINIC | Age: 65
End: 2025-06-20
Payer: COMMERCIAL

## 2025-06-20 ASSESSMENT — PATIENT HEALTH QUESTIONNAIRE - PHQ9
3. TROUBLE FALLING OR STAYING ASLEEP: NEARLY EVERY DAY
4. FEELING TIRED OR HAVING LITTLE ENERGY: NEARLY EVERY DAY
6. FEELING BAD ABOUT YOURSELF - OR THAT YOU ARE A FAILURE OR HAVE LET YOURSELF OR YOUR FAMILY DOWN: SEVERAL DAYS
SUM OF ALL RESPONSES TO PHQ QUESTIONS 1-9: 13
1. LITTLE INTEREST OR PLEASURE IN DOING THINGS: SEVERAL DAYS
8. MOVING OR SPEAKING SO SLOWLY THAT OTHER PEOPLE COULD HAVE NOTICED. OR THE OPPOSITE, BEING SO FIGETY OR RESTLESS THAT YOU HAVE BEEN MOVING AROUND A LOT MORE THAN USUAL: SEVERAL DAYS
7. TROUBLE CONCENTRATING ON THINGS, SUCH AS READING THE NEWSPAPER OR WATCHING TELEVISION: SEVERAL DAYS
SUM OF ALL RESPONSES TO PHQ9 QUESTIONS 1 & 2: 2
9. THOUGHTS THAT YOU WOULD BE BETTER OFF DEAD, OR OF HURTING YOURSELF: SEVERAL DAYS
5. POOR APPETITE OR OVEREATING: SEVERAL DAYS
2. FEELING DOWN, DEPRESSED OR HOPELESS: SEVERAL DAYS

## 2025-06-20 ASSESSMENT — ANXIETY QUESTIONNAIRES
7. FEELING AFRAID AS IF SOMETHING AWFUL MIGHT HAPPEN: SEVERAL DAYS
3. WORRYING TOO MUCH ABOUT DIFFERENT THINGS: SEVERAL DAYS
5. BEING SO RESTLESS THAT IT IS HARD TO SIT STILL: NOT AT ALL
2. NOT BEING ABLE TO STOP OR CONTROL WORRYING: NOT AT ALL
IF YOU CHECKED OFF ANY PROBLEMS ON THIS QUESTIONNAIRE, HOW DIFFICULT HAVE THESE PROBLEMS MADE IT FOR YOU TO DO YOUR WORK, TAKE CARE OF THINGS AT HOME, OR GET ALONG WITH OTHER PEOPLE: SOMEWHAT DIFFICULT
GAD7 TOTAL SCORE: 6
6. BECOMING EASILY ANNOYED OR IRRITABLE: SEVERAL DAYS
1. FEELING NERVOUS, ANXIOUS, OR ON EDGE: SEVERAL DAYS
4. TROUBLE RELAXING: MORE THAN HALF THE DAYS

## 2025-06-20 NOTE — PROGRESS NOTES
"Collaborative Care ([facility]) Initial Assessment    Session Time  Start: 11:30am  End: 12:34pm     Collaborative Care program information (including case discussion with psychiatry, involvement of [facility] and billing when applicable) was provided and discussed with the patient. Patient Indicated understanding and agreed to proceed.   Confirm: Yes    Patient Health Questionnaire-9 Score: 13 (6/20/2025 12:08 PM)  RUPINDER-7 Total Score: 6 (6/20/2025 12:17 PM)    Reason for Visit / Chief Complaint  Chief Complaint   Patient presents with    Depression    Anxiety     Accompanied by: Self  Guardian Status: Self  Caregiver Status: Does not have a caregiver    The patient was originally referred to Collaborative Care for, “Last time, about a month ago, I was telling him I was feeling a lot of anxiety. I had gained some weight and my cholesterol was high, my blood pressure was okay. I was basically wasn't in a good place\".     Review of Symptoms    Sleep   Sleep Symptoms: interrupted sleep and vivid dreams This is a big one, I've never had problems. I have had problems sleeping, weird dreams and worry. I seem to dream about things that I need to do and get done. A lot of it's related to work. Patient shared the doctor told her to take Benadryl--Doesn't feel as if it was that effective, dreams continue and possibly can fall asleep a little better.   Sleep Hygiene: fair sleep hygiene     Mood   Symptom Onset/Duration: 25 years ago sought out counseling for infertility, recent increase in severity due to political stressors (employed with [employer]).   Current Sx: little interest/pleasure doing things, feeling down, trouble staying asleep, feeling tired/little energy, overeating, feeling bad about self, trouble concentrating, and fidgety/restless Endorsed some lack of motivation then she has in the past, doesn't want to go as many places as she used to. Severe drowsiness by the end of the day, out of her norm. Typically she's pretty energized. "     Anxiety   Symptom Onset/Duration: Most days in 2+ years  Current Sx: feeling nervous/anxious/on edge, worrying too much, trouble relaxing, easily annoyed/irritable, and afraid something awful may happen Sometimes anxious/on edge. Aware that caffeine can contribute to that. Loves coffee. Aware cutting back could support anxiety mgmt.   Panic / Somatic Sx: none  Triggers: situation(s)    Self-Esteem / Self-Image   Self-Esteem / Self Image Sx: struggles with confidence    Appetite   Description of Overall Appetite: increased appetite  Eating Behaviors: significant consumption fast food/unhealthy snacks Eye opening doctors appointment results, helped her to make better choices.   Concerns with appetite: worries about weight/body shape and feels cannot control eating--actively trying to eat healthier and therefore she has been feeling better. Feels clothes fit better.     Anger / Irritability  Symptoms of Anger / Irritability: easily agitated -- Finds self experiencing increased irritability toward her spouse / able to recognize     Trauma    Traumatic Experiences: none, denied Patient shared, that she has been very gabby. Losing her parents was hard. It was a hard time when the girls were teenagers. Sometimes even when they're doing better, we're waiting for the other shoe to drop.     Grief / Loss / Adjustment   Symptom Onset/Duration: more than 1 year  Current Sx: feeling emotionally overwhelmed Fears related to her job and possible changes. Feels she needs to exhaust herself in order to relax, avril when anxiety is bad.   Factors of Grief / Loss / Adjustment: Fear of changes with her job     Learning Concerns / Memory   Learning Concerns & Sx: trouble with focus and concentrating  Memory Concerns & Sx: none, denied    Functional impairment   Impacting Ability : to focus/concentrate, to sleep, in communicating with others, and to engage in pleasurable activities Lack of motivation to clean after work/weekends. Wants  to do it, but doesn't seem to have the energy.     Associated Medical Concerns   Potential Associated Factors: None    Comprehensive Behavioral Health History     Medications  Current Mental Health Medications:   Lexapro 10mg tablet: I think it's working, I've been taking it for years. It's hard to tell at this point because I've been taking it for so long. Sometimes I think if I wasn't on it it would be a lot harder. No side effects.     Past Mental Health Medications:   Zoloft (ineffective)     Open to medication recommendations from consulting psychiatrist? Yes--Is interested in something for sleep, also if an increase in Lexapro would be good.     Mental Health Treatment History  Mental Health Treatment: individual therapy  Reason/When/Where/Outcome: Last time engaged in counseling was 25 years ago for infertility issues -- Family counseling with daughters      Risk History  Suicidal Thoughts/Method/Intent/Plan: passive suicidal thoughts/denied plans/intent   Suicide Attempts/Preparations: None, denied  Number of Suicide Attempts: 0  Access to Firearms/Lethal Means: No guns in home  Non-Suicidal Self Injury: None, denied  Last [place] Risk Score:    Protective Factors: sense of responsibility towards family, child-related concerns - children <19 y/o, positive family relationships, and marriage/partnership    Substance Use History    Substances    Social History     Substance and Sexual Activity   Alcohol Use Yes    Comment: socially     Social History     Substance and Sexual Activity   Drug Use Never       Substance Current Use   Alcohol Yes, current; Amount: socially                  Family History    Mental Health / Conditions    Family Member Condition / Diagnosis Medications / Side Effects   Adopted daughters  Biological family hx                      Substance Use    Family Member Substance Current Use                         History of Suicide    Family Member Details             Social  "History    Housing   Living Situation: lives with spouse of over 37 years and 28yo daughter, her  and their two children  (3yo,7yo)   Safe Housing Conditions / Feels Safe in Home: Yes    Employment  Current Employment: employed--writer/ at [employer]  Current Concerns/Challenges: Yes, describe: worrisome about cuts, patient has been there for nearly 30 years     Income   Current Concerns/Challenges: No--increased worries due to possible increased financial stress.   Receive Benefits/Assistance: No    Education   Status / Level of Education: Bachelor's degree of Arts from [employer]     Legal   Legal Considerations: None, denied    Relationships   S/O:  Faustino, 37 years , \"It's an overall good relationship we love each other, we make sure that we tell each other we do. We are different, we kind of balance each other. Right now, he's one of those people totally stays out of politics\". Increased arguments when she tries to share feelings.   Parents/Guardian: Mother passed away 28 years ago, Father 10 years ago   Siblings: Two brothers and a sister -- Third in line. Patient shared they are close, they have a good relationship   Other: Three daughters 27,25,21--Patient shared she is closest with Yvonne, they have a lot in common and think alike. Two other daughters are adopted. Worked through the years in counseling and different things.     Confucianist/ Spirituality   Are you Rastafarian or Spiritual: Yes  Confucianist / Practice: Non-Latter-day \"Cradle Alevism\"   Spiritual Practice: seeks meaning/purpose in life    Coping / Strengths / Supports   Coping:  Patient shared, \"I love to work out, we have a fitness center at [facility], I go at lunch time and do classes. I like to do that, I like to walk. Exercise is something that I recognized years ago that makes me feel better and regulates me more. I do have a couple friends that I like getting together we, Friday night I have a couple girlfriends that we either watch TV " "or have a fire\".   Strengths: Patient shared, \"Fortitude, resilience, I think that I have a lot of resilience. I think I have gotten through things really well without going off the deep end. Being able to maintain my family, the job. When It comes time for issue, I want to talk about it. I'm always about steps to improve things, I'm very compassionate\". Tries to not .   Supports: Friend Vanessa, , Yvonne     Assessment Summary  / Plan    Assessment Summary:  What do you want to work on/get out of collaborative care? Patient shared, \"I am someone who looks for ways to work through, methods and ways the areas of my life that have the stress. Right now, I am feeling better then I was a few weeks ago. Even with the job I'm trying to look for positives\".     Plan:   Psych consult - ongoing, once a month, Khyxvcd-Yanluizx-Waxxkkyi interventions, and provide psycho-education    Follow up in 4 weeks (on 7/18/2025).    Provisional Findings / Impressions  Primary: Patient is a 65 year old female referred to Collaborative Care for an increase in depression and anxiety. Patient shared 25 years ago sought out counseling for infertility, recent increase in severity due to political stressors (employed with [employer]). Endorsed some lack of motivation then she has in the past, doesn't want to go as many places as she used to. Severe drowsiness by the end of the day, out of her norm. Typically she's pretty energized. Patient also endorsed interrupted sleep and vivid dreams (work related-not nightmares). Patient shared the doctor told her to take Benadryl--Doesn't feel as if it was that effective, dreams continue and possibly can fall asleep a little better. Sometimes anxious/on edge. Aware that caffeine can contribute to that. Loves coffee. Aware cutting back could support anxiety mgmt. Endorsed minimal hx of trauma, some instances during there daughters teenage years that were stressful.     Medications  Current Mental Health " Medications:   Lexapro 10mg tablet: I think it's working, I've been taking it for years. It's hard to tell at this point because I've been taking it for so long. Sometimes I think if I wasn't on it it would be a lot harder. No side effects.     Past Mental Health Medications:   Zoloft (ineffective)     Open to medication recommendations from consulting psychiatrist? Yes--Is interested in something for sleep, also if an increase in Lexapro would be good.

## 2025-06-30 ENCOUNTER — DOCUMENTATION (OUTPATIENT)
Dept: PRIMARY CARE | Facility: CLINIC | Age: 65
End: 2025-06-30
Payer: COMMERCIAL

## 2025-06-30 DIAGNOSIS — F41.9 ANXIETY: Primary | ICD-10-CM

## 2025-07-03 ENCOUNTER — TELEPHONE (OUTPATIENT)
Dept: PRIMARY CARE | Facility: CLINIC | Age: 65
End: 2025-07-03
Payer: COMMERCIAL

## 2025-07-03 ENCOUNTER — DOCUMENTATION (OUTPATIENT)
Dept: BEHAVIORAL HEALTH | Facility: CLINIC | Age: 65
End: 2025-07-03
Payer: COMMERCIAL

## 2025-07-03 DIAGNOSIS — F41.9 ANXIETY: Primary | ICD-10-CM

## 2025-07-03 DIAGNOSIS — F51.01 PRIMARY INSOMNIA: ICD-10-CM

## 2025-07-03 RX ORDER — MIRTAZAPINE 15 MG/1
TABLET, FILM COATED ORAL
Qty: 90 TABLET | Refills: 0 | Status: SHIPPED | OUTPATIENT
Start: 2025-07-03

## 2025-07-03 RX ORDER — ESCITALOPRAM OXALATE 20 MG/1
20 TABLET ORAL DAILY
Qty: 90 TABLET | Refills: 0 | Status: SHIPPED | OUTPATIENT
Start: 2025-07-03

## 2025-07-03 NOTE — PROGRESS NOTES
Patient is requesting to move forward with recommendations to increase escitalopram to 20mg and trail mirtazapine 15mg at bedtime as needed for insomnia    Please advise, thanks!     Recommendations:   Can increase escitalopram to 20mg daily for depression and anxiety  Can trial mirtazapine 15mg at bedtime as needed for insomnia

## 2025-07-03 NOTE — TELEPHONE ENCOUNTER
Advise pt that we will proceed with the recommendations that she discussed with behavioral health.    This includes increasing the escitalopram to 20 mg and starting her on mirtazapine 15 mg as needed at bedtime.     Prescriptions will be sent to her [facility] pharmacy.

## 2025-07-03 NOTE — PROGRESS NOTES
[facility] Psychiatry Consult Note     Daily Rojo is a 65 y.o., referred to Collaborative Care for symptoms of depression and anxiety. I have reviewed the patient with the behavioral health manager and reviewed the patient's electronic record. Anxiety and depression increased secondary to work stress. Drinks a lot of coffee.    Current Meds:  Escitalopram 10mg daily - taking for years  Benadryl OTC (dose unknown) at bedtime for insomnia - helped her fall asleep, but didn't help overall sleep    Past meds:  Sertraline - didn't work    Recommendations:   Can increase escitalopram to 20mg daily for depression and anxiety  Can trial mirtazapine 15mg at bedtime as needed for insomnia      Patient Health Questionnaire-9 Score: 13 (6/20/2025 12:08 PM)  RUPINDER-7 Total Score: 6 (6/20/2025 12:17 PM)      The above treatment considerations and suggestions are based on consultations with the patient's care manager and a review of information available in the electronic medical record. I have not personally examined the patient. All recommendations should be implemented with consideration of the patient's relevant prior history and current clinical status. Please feel free to call me with any questions about the care of this patient.

## 2025-07-18 ENCOUNTER — APPOINTMENT (OUTPATIENT)
Dept: PRIMARY CARE | Facility: CLINIC | Age: 65
End: 2025-07-18
Payer: COMMERCIAL

## 2025-07-18 NOTE — PROGRESS NOTES
Collaborative Care ([facility])  Progress Note    Type of Interaction: Virtual    Start Time: 12:00pm    End Time: 1:00pm    Appointment: Not Scheduled    Reason for Visit:   Chief Complaint   Patient presents with    Depression    Anxiety     Interval History / Patient Symptoms:     Occ anxious, work related     Interventions Provided: Psychoeducation, Behavioral Activation, Motivational Interviewing, Strengths Exploration, Values Exploration, Develop Coping Strategies, Review Progress/Goals Stress Management, Mindfulness, and Treatment Planning    Progress Made: Moderate    Response to Intervention: Patient shared future at work looks dim. Has updated her resume, procrastinating on uploading her portfolio. Sleep has been better, calm dreams. 1/2 mirtazapine makes her drowsy so she has not been taking it at often, might take it earlier in the evening. Pharmacy had concerns about lexapro and mirtaz--to inquire with psych consultant. Processed through additional stressors with her eldest daughter. Validated patient's feeling and responses. Explored areas of life that patient has control over vs what they do not, CBT techniques in challenging her thoughts, and coping skills to improve overall life satisfaction. Coping by time with friends, cleaning, and exercising when she finds herself ruminating.     Plan: Upload portfolio and less procrastinating.     Patient Instructions   8/8 @ 10am VV    Follow Up / Next Appointment: Next appointment: 08/08/25

## 2025-07-31 ENCOUNTER — DOCUMENTATION (OUTPATIENT)
Dept: PRIMARY CARE | Facility: CLINIC | Age: 65
End: 2025-07-31
Payer: COMMERCIAL

## 2025-07-31 DIAGNOSIS — F41.9 ANXIETY: Primary | ICD-10-CM

## 2025-08-08 ENCOUNTER — APPOINTMENT (OUTPATIENT)
Dept: PRIMARY CARE | Facility: CLINIC | Age: 65
End: 2025-08-08
Payer: COMMERCIAL

## 2025-08-08 NOTE — PROGRESS NOTES
Collaborative Care ([facility])  Progress Note    Type of Interaction: Virtual    Start Time: 10:00am    End Time: 10:47am    Appointment: Not Scheduled    Reason for Visit:   Chief Complaint   Patient presents with    Depression    Anxiety     Interval History / Patient Symptoms:     Doing better at challenging her thoughts and using mindfulness     Interventions Provided: Psychoeducation, Acceptance & Commitment Therapy, Motivational Interviewing, Strengths Exploration, Values Exploration, Develop Coping Strategies, Review Progress/Goals Stress Management, Mindfulness, and Treatment Planning    Progress Made: Moderate    Response to Intervention: Patient shared things are leaning in a positive direction at work. Taking in control over what she has control over--offering to support other dept needs and take on more work. Going noticed by higher ups. Patient did complete her portfolio which was her goal set at last appointment. Expressed she has been actively working on mindfulness/living in the present moment. Processed through stressors with oldest daughter and provided resources to patient at her request. Believes medication regiment is going great, has not taken Mirtazapine, sleeps has been okay lately. Explored areas of life that patient has control over vs what they do not, CBT techniques in challenging her thoughts, and coping skills to improve overall life satisfaction.     Plan: Provided patient with resources for support in the community today at her request (family therapy), PHQ/RUPINDER update     Patient Instructions   8/29 @ 11:30am VV    Follow Up / Next Appointment: Next appointment: 08/29/25

## 2025-08-29 ENCOUNTER — DOCUMENTATION (OUTPATIENT)
Dept: PRIMARY CARE | Facility: CLINIC | Age: 65
End: 2025-08-29

## 2025-08-29 ENCOUNTER — APPOINTMENT (OUTPATIENT)
Dept: PRIMARY CARE | Facility: CLINIC | Age: 65
End: 2025-08-29
Payer: COMMERCIAL

## 2025-08-29 DIAGNOSIS — F41.9 ANXIETY: Primary | ICD-10-CM

## 2025-09-26 ENCOUNTER — APPOINTMENT (OUTPATIENT)
Dept: PRIMARY CARE | Facility: CLINIC | Age: 65
End: 2025-09-26
Payer: COMMERCIAL

## 2025-11-03 ENCOUNTER — APPOINTMENT (OUTPATIENT)
Dept: PRIMARY CARE | Facility: CLINIC | Age: 65
End: 2025-11-03
Payer: COMMERCIAL